# Patient Record
Sex: FEMALE | Race: WHITE | URBAN - METROPOLITAN AREA
[De-identification: names, ages, dates, MRNs, and addresses within clinical notes are randomized per-mention and may not be internally consistent; named-entity substitution may affect disease eponyms.]

---

## 2023-07-17 ENCOUNTER — OFFICE VISIT (OUTPATIENT)
Dept: URBAN - METROPOLITAN AREA CLINIC 76 | Facility: CLINIC | Age: 73
End: 2023-07-17
Payer: MEDICARE

## 2023-07-17 DIAGNOSIS — H52.4 PRESBYOPIA: ICD-10-CM

## 2023-07-17 DIAGNOSIS — H25.13 AGE-RELATED NUCLEAR CATARACT, BILATERAL: Primary | ICD-10-CM

## 2023-07-17 PROCEDURE — 99205 OFFICE O/P NEW HI 60 MIN: CPT | Performed by: OPHTHALMOLOGY

## 2023-07-17 RX ORDER — KETOROLAC TROMETHAMINE 5 MG/ML
0.5 % SOLUTION OPHTHALMIC
Qty: 5 | Refills: 1 | Status: ACTIVE
Start: 2023-07-17

## 2023-07-17 ASSESSMENT — VISUAL ACUITY
OD: 20/25
OS: 20/30

## 2023-07-17 ASSESSMENT — INTRAOCULAR PRESSURE
OD: 14
OS: 13

## 2023-07-17 ASSESSMENT — KERATOMETRY
OS: 45.88
OD: 46.00

## 2023-07-17 NOTE — IMPRESSION/PLAN
Impression: Age-related nuclear cataract, bilateral: H25.13. Visually significant. Plan: Cataracts account for the patient's complaints. Discussed all risks, benefits, procedures and recovery. Patient understands changing glasses will not improve vision. Advised no guarantee of glasses independence with any IOL, advised the need for glasses for dva and nva after surgery. Pt understands. Patient desires to have surgery, recommend CE IOL OU, OS first.  Discussed IOL options, recommend STANDARD or VIVITY IOL, pt will consider options. TARGET: DISTANCE OU.  RL2. Recommend DEXTENZA + MOXIFLOXACIN injection, discussed need for topical supplementation post operatively w/ Ketorolac BID in surgical eye x 2 weeks. Recommend ORA. Pt needs Ascan. Will rely on Dr. Verónica Matute for primary and post operative eye care.

## 2023-08-16 DIAGNOSIS — H25.13 AGE-RELATED NUCLEAR CATARACT, BILATERAL: Primary | ICD-10-CM

## 2023-08-16 RX ORDER — KETOROLAC TROMETHAMINE 5 MG/ML
0.5 % SOLUTION OPHTHALMIC
Qty: 5 | Refills: 1 | Status: ACTIVE
Start: 2023-08-16

## 2023-08-16 ASSESSMENT — PACHYMETRY
OD: 2.74
OS: 22.27
OD: 22.17
OS: 2.75

## 2023-08-29 ENCOUNTER — SURGERY (OUTPATIENT)
Dept: URBAN - METROPOLITAN AREA SURGERY 47 | Facility: SURGERY | Age: 73
End: 2023-08-29
Payer: MEDICARE

## 2023-08-29 DIAGNOSIS — H25.13 AGE-RELATED NUCLEAR CATARACT, BILATERAL: Primary | ICD-10-CM

## 2023-08-29 PROCEDURE — PR1CP PR1CP: CUSTOM | Performed by: OPHTHALMOLOGY

## 2023-08-29 PROCEDURE — 66984 XCAPSL CTRC RMVL W/O ECP: CPT | Performed by: OPHTHALMOLOGY

## 2023-09-19 ENCOUNTER — SURGERY (OUTPATIENT)
Dept: URBAN - METROPOLITAN AREA SURGERY 47 | Facility: SURGERY | Age: 73
End: 2023-09-19
Payer: MEDICARE

## 2023-09-19 DIAGNOSIS — H25.11 AGE-RELATED NUCLEAR CATARACT, RIGHT EYE: Primary | ICD-10-CM

## 2023-09-19 PROCEDURE — PR1CP PR1CP: CUSTOM | Performed by: OPHTHALMOLOGY
